# Patient Record
Sex: FEMALE | Race: OTHER | URBAN - NONMETROPOLITAN AREA
[De-identification: names, ages, dates, MRNs, and addresses within clinical notes are randomized per-mention and may not be internally consistent; named-entity substitution may affect disease eponyms.]

---

## 2024-06-06 ENCOUNTER — HOSPITAL ENCOUNTER (EMERGENCY)
Age: 24
Discharge: HOME | End: 2024-06-06

## 2024-06-06 VITALS
RESPIRATION RATE: 17 BRPM | DIASTOLIC BLOOD PRESSURE: 69 MMHG | OXYGEN SATURATION: 98 % | SYSTOLIC BLOOD PRESSURE: 106 MMHG | TEMPERATURE: 98.1 F | HEART RATE: 81 BPM

## 2024-06-06 DIAGNOSIS — N61.1: Primary | ICD-10-CM

## 2024-06-06 PROCEDURE — 99282 EMERGENCY DEPT VISIT SF MDM: CPT

## 2024-06-06 NOTE — ED TRIAGE NOTES
Pt to ED via private vehicle w/rprts of needing further evaluation of abscess to R breast. Pt rprts sent in by pcp. Denies pain at this time. No redness or heat noted to site. Some induration at 3 o'clock noted to R areola.

## 2024-06-07 NOTE — ED PROVIDER NOTES
significant fluid collection was noted under the irregular skin.  No signs of infection.  I do not feel the patient requires further antibiotics at this time.  They should follow-up for repeat ultrasound, and mammogram.  They need to follow-up with OB/GYN for continued evaluation.  May need skin biopsy if abnormal skin persists.  Patient verbalized understanding of plan of care.  Medications - No data to display    Patient was seen independently by myself. The patient's final impression and disposition and plan was determined by myself.     CRITICAL CARE:   None    CONSULTS:  None    PROCEDURES:  None    FINAL IMPRESSION     1. Chronic abscess of areola          DISPOSITION/PLAN   Patient discharged  PATIENT REFERREDTO:  Alison Morales APRN - NP  [address] [facility] [address] [phone]    Go to   For follow up and evaluation      DISCHARGE MEDICATIONS:  There are no discharge medications for this patient.      (Please note that portions of this note were completed with a voice recognition program.  Efforts were made to edit the dictations but occasionally words are mis-transcribed.)      Provider:  I personally performed the services described in the documentation,reviewed and edited the documentation which was dictated to the scribe in my presence, and it accurately records my words and actions.    Zachary Dixon CNP 06/06/24 8:23 PM    ANA PAULA Galindo CNP, [place], APRN - CNP  06/06/24 2028

## 2024-06-27 ENCOUNTER — OFFICE VISIT (OUTPATIENT)
Dept: FAMILY MEDICINE CLINIC | Age: 24
End: 2024-06-27

## 2024-06-27 VITALS
OXYGEN SATURATION: 98 % | RESPIRATION RATE: 15 BRPM | DIASTOLIC BLOOD PRESSURE: 66 MMHG | HEART RATE: 81 BPM | HEIGHT: 59 IN | TEMPERATURE: 98.1 F | SYSTOLIC BLOOD PRESSURE: 98 MMHG | BODY MASS INDEX: 31.85 KG/M2 | WEIGHT: 158 LBS

## 2024-06-27 DIAGNOSIS — N61.1 BREAST ABSCESS: Primary | ICD-10-CM

## 2024-06-27 PROCEDURE — 99203 OFFICE O/P NEW LOW 30 MIN: CPT

## 2024-06-27 SDOH — ECONOMIC STABILITY: FOOD INSECURITY: WITHIN THE PAST 12 MONTHS, YOU WORRIED THAT YOUR FOOD WOULD RUN OUT BEFORE YOU GOT MONEY TO BUY MORE.: NEVER TRUE

## 2024-06-27 SDOH — ECONOMIC STABILITY: FOOD INSECURITY: WITHIN THE PAST 12 MONTHS, THE FOOD YOU BOUGHT JUST DIDN'T LAST AND YOU DIDN'T HAVE MONEY TO GET MORE.: NEVER TRUE

## 2024-06-27 SDOH — ECONOMIC STABILITY: HOUSING INSECURITY
IN THE LAST 12 MONTHS, WAS THERE A TIME WHEN YOU DID NOT HAVE A STEADY PLACE TO SLEEP OR SLEPT IN A SHELTER (INCLUDING NOW)?: NO

## 2024-06-27 SDOH — ECONOMIC STABILITY: INCOME INSECURITY: HOW HARD IS IT FOR YOU TO PAY FOR THE VERY BASICS LIKE FOOD, HOUSING, MEDICAL CARE, AND HEATING?: NOT HARD AT ALL

## 2024-06-27 ASSESSMENT — PATIENT HEALTH QUESTIONNAIRE - PHQ9
SUM OF ALL RESPONSES TO PHQ QUESTIONS 1-9: 0
SUM OF ALL RESPONSES TO PHQ QUESTIONS 1-9: 0
SUM OF ALL RESPONSES TO PHQ9 QUESTIONS 1 & 2: 0
SUM OF ALL RESPONSES TO PHQ QUESTIONS 1-9: 0
2. FEELING DOWN, DEPRESSED OR HOPELESS: NOT AT ALL
1. LITTLE INTEREST OR PLEASURE IN DOING THINGS: NOT AT ALL
SUM OF ALL RESPONSES TO PHQ QUESTIONS 1-9: 0

## 2024-06-27 NOTE — PROGRESS NOTES
Patient:Nargis Conley  YOB: 2000   MRN:[id number]    Subjective   24 y.o. female who presents for the following: New Patient (Pt wants to discuss a referral to OBGYN. )    24 year old is a new patient with no significant medical history presents for acute visit. Patient reports lump/tenderness on right breast for past 2 months. Patient reports 2 months prior lump was draining pus, blood with green/white fluid. At the time, Ultrasound was suspicious for abscess along the right areola, and patient was managed with antibiotics. Patient reports ongoing tenderness/lump along the right areola. Patient denies any recent trauma, ongoing drainage, fever, chills, nipple retractions, and weight loss.     OBGYN History:  vaginal delivery with no complication (4 year old daughter) not actively breast feeding, sexually active with partner, currently on Medroxyprogesterone acetate injectable (No monthly periods per patient)    No prior Medical History. NKDA. No prior Surgeries. Not currenly on any medications  FH: no medical history  SH: Works as a care taker, Non-alcoholic, no tobacco use history, no Illicit drug use    Recent Ultrasound of breast, and mammogram of breast.  Pending mammogram in 2024.               Review of Systems   Constitutional:         Breast lump and tenderness   All other systems reviewed and are negative.    PMHx: She has no past medical history on file.    Objective     Vitals:    24 1512   BP: 98/66   Site: Right Upper Arm   Pulse: 81   Resp: 15   Temp: 98.1 °F (36.7 °C)   TempSrc: Oral   SpO2: 98%   Weight: 71.7 kg (158 lb)   Height: 1.499 m (4' 11\")   Body mass index is 31.91 kg/m².    Physical Exam  Exam conducted with a chaperone present.   Constitutional:       General: She is not in acute distress.     Appearance: Normal appearance. She is not ill-appearing.   HENT:      Head: Normocephalic and atraumatic.   Cardiovascular:      Rate and Rhythm: Normal

## 2024-07-31 ENCOUNTER — OFFICE VISIT (OUTPATIENT)
Dept: FAMILY MEDICINE CLINIC | Age: 24
End: 2024-07-31

## 2024-07-31 VITALS
DIASTOLIC BLOOD PRESSURE: 72 MMHG | WEIGHT: 157 LBS | RESPIRATION RATE: 18 BRPM | TEMPERATURE: 98.1 F | BODY MASS INDEX: 30.82 KG/M2 | SYSTOLIC BLOOD PRESSURE: 108 MMHG | HEIGHT: 60 IN | HEART RATE: 74 BPM | OXYGEN SATURATION: 99 %

## 2024-07-31 DIAGNOSIS — N61.1 BREAST ABSCESS: ICD-10-CM

## 2024-07-31 DIAGNOSIS — Z00.00 LABORATORY EXAM ORDERED AS PART OF ROUTINE GENERAL MEDICAL EXAMINATION: Primary | ICD-10-CM

## 2024-07-31 LAB — PAP SMEAR: NORMAL

## 2024-07-31 PROCEDURE — 99213 OFFICE O/P EST LOW 20 MIN: CPT

## 2024-07-31 NOTE — PROGRESS NOTES
Health Maintenance Due   Topic Date Due    Hepatitis B vaccine (1 of 3 - 3-dose series) Never done    COVID-19 Vaccine (1) Never done    Varicella vaccine (1 of 2 - 2-dose childhood series) Never done    HPV vaccine (1 - 2-dose series) Never done    HIV screen  Never done    Hepatitis C screen  Never done    Pap smear  Never done

## 2024-07-31 NOTE — PROGRESS NOTES
Patient:Nargis Conley  YOB: 2000   MRN:[id number]    Subjective   24 y.o. female who presents for the following: Follow-up (Breast tenderness )    23y/o female no significant PMH. Prior visit presented with healing right breast abscess confirmed on prior Ultrasounds. Patient s/p 10 days antibiotics treatment with doxycycline and Augmentin.    Presents for follow up on recent Ultrasound of right breast and establishment of care. Patient notes ongoing right breast tenderness over healing breast abscess. Denies any fever, chills, warmth, drainage, nipple retraction. Not currently breast feeding.    Obgyn HX:  female. Prior vaginal delivery (4 years ago). Uncomplicated pregnancy. On Medroxyprogesterone injectable (no menstrual cycle). Sexually active.     Family history :no prior family history    Screening   - Mammogram- completed  - DEXA- no indication  - Colonoscopy-no indication  - Pap Smear- performed in clinic- pending results      Review of Systems   Constitutional:         Breast lump and tenderness   All other systems reviewed and are negative.    PMHx: She has no past medical history on file.    Objective     Vitals:    24 1545   BP: 108/72   Site: Right Upper Arm   Position: Sitting   Pulse: 74   Resp: 18   Temp: 98.1 °F (36.7 °C)   TempSrc: Oral   SpO2: 99%   Weight: 71.2 kg (157 lb)   Height: 1.511 m (4' 11.5\")   Body mass index is 31.18 kg/m².    Physical Exam  Exam conducted with a chaperone present.   Constitutional:       General: She is not in acute distress.     Appearance: Normal appearance. She is not ill-appearing.   HENT:      Head: Normocephalic and atraumatic.   Cardiovascular:      Rate and Rhythm: Normal rate and regular rhythm.      Pulses: Normal pulses.      Heart sounds: Normal heart sounds. No murmur heard.     No friction rub. No gallop.   Pulmonary:      Effort: Pulmonary effort is normal. No respiratory distress.      Breath sounds: Normal breath

## 2024-07-31 NOTE — PROGRESS NOTES
Attending attestation:  I personally performed and participated key or critical portions of the evaluation and management including personally performing the exam and medical decision making.  I verify the accuracy of the documentation by the resident with the following addition or changes: 24 year old female here for right breast abscess.  Was treated with doxycycline and augmentin.  Was 3 cm across on last ultrasound.  On exam, not clearly palpable. Slight erythema at healing site of spontaneous drainage adjacent to nipple at 3 o'clock but no warmth or erthema otherwise.  Will repeat ultrasound. Plan based on results. If still present refer to Dr. Jimenez.  Well check with pap today also. Cervical ectropion present but hopefully okay on pap. No symptoms. Await results.  No menses in quite some time but on depo.  Declines pregnancy test today.            Electronically signed by Esther Torres MD on 7/31/2024 at 3:47 PM

## 2024-08-02 ENCOUNTER — LAB (OUTPATIENT)
Dept: LAB | Age: 24
End: 2024-08-02

## 2024-08-02 DIAGNOSIS — Z00.00 LABORATORY EXAM ORDERED AS PART OF ROUTINE GENERAL MEDICAL EXAMINATION: ICD-10-CM

## 2024-08-02 LAB
HCV IGG SERPL QL IA: NEGATIVE
HIV 1+2 AB+HIV1 P24 AG SERPL QL IA: NORMAL

## 2024-08-10 LAB — CYTOLOGY THIN PREP PAP: NORMAL

## 2024-08-14 ENCOUNTER — TELEPHONE (OUTPATIENT)
Dept: FAMILY MEDICINE CLINIC | Age: 24
End: 2024-08-14

## 2024-08-14 ENCOUNTER — HOSPITAL ENCOUNTER (OUTPATIENT)
Dept: WOMENS IMAGING | Age: 24
Discharge: HOME | End: 2024-08-14
Attending: RADIOLOGY

## 2024-08-14 ENCOUNTER — HOSPITAL ENCOUNTER (OUTPATIENT)
Dept: WOMENS IMAGING | Age: 24
Discharge: HOME | End: 2024-08-14
Payer: COMMERCIAL

## 2024-08-14 DIAGNOSIS — N61.1 BREAST ABSCESS: ICD-10-CM

## 2024-08-14 DIAGNOSIS — Z00.6 ENCOUNTER FOR EXAMINATION FOR NORMAL COMPARISON OR CONTROL IN CLINICAL RESEARCH PROGRAM: ICD-10-CM

## 2024-08-14 DIAGNOSIS — Z01.419 WELL WOMAN EXAM WITH ROUTINE GYNECOLOGICAL EXAM: ICD-10-CM

## 2024-08-14 PROCEDURE — 76642 ULTRASOUND BREAST LIMITED: CPT

## 2024-08-14 NOTE — TELEPHONE ENCOUNTER
----- Message from Arline SANABRIA sent at 8/13/2024  7:13 AM EDT -----  Please notify patient that their lab results are normal. Pap smear was normal. Thanks

## 2024-08-15 NOTE — TELEPHONE ENCOUNTER
Attempted to contact patient using interpretor services. Per HIPAA, interpretor left detailed VM.

## 2024-08-16 DIAGNOSIS — L02.91 ABSCESS: Primary | ICD-10-CM

## 2024-08-16 DIAGNOSIS — Z09 FOLLOW-UP EXAM: ICD-10-CM

## 2024-08-20 ENCOUNTER — FOLLOWUP TELEPHONE ENCOUNTER (OUTPATIENT)
Dept: FAMILY MEDICINE CLINIC | Age: 24
End: 2024-08-20

## 2024-08-20 NOTE — TELEPHONE ENCOUNTER
Please inform patient Breast imaging showed mildly reduced abscess from prior studies in 07/2024. We will repeat another ultrasound in one month to access any further change. We will consider referral to surgery pending results on the next ultrasound of breast. The scheduling office will call to schedule an appointment. Thanks

## 2024-08-21 NOTE — TELEPHONE ENCOUNTER
Received call from patient and friend(HIPAA) requesting results from US. Advised patient results per Dr. Robertson. Patient and friend stated that did not make sense and patient is still in a great deal of pain and wants to know what she has to do next. Patient scheduled appointment for 8/27/2024.

## 2024-08-27 ENCOUNTER — OFFICE VISIT (OUTPATIENT)
Dept: FAMILY MEDICINE CLINIC | Age: 24
End: 2024-08-27

## 2024-08-27 VITALS
RESPIRATION RATE: 20 BRPM | BODY MASS INDEX: 30.78 KG/M2 | OXYGEN SATURATION: 98 % | SYSTOLIC BLOOD PRESSURE: 98 MMHG | DIASTOLIC BLOOD PRESSURE: 54 MMHG | WEIGHT: 156.8 LBS | HEIGHT: 60 IN | HEART RATE: 78 BPM | TEMPERATURE: 98.9 F

## 2024-08-27 DIAGNOSIS — N64.4 BREAST TENDERNESS: ICD-10-CM

## 2024-08-27 DIAGNOSIS — N61.1 BREAST ABSCESS: Primary | ICD-10-CM

## 2024-08-27 PROCEDURE — 99213 OFFICE O/P EST LOW 20 MIN: CPT

## 2024-08-27 RX ORDER — IBUPROFEN 600 MG/1
600 TABLET, FILM COATED ORAL 3 TIMES DAILY PRN
Qty: 30 TABLET | Refills: 0 | Status: SHIPPED | OUTPATIENT
Start: 2024-08-27

## 2024-08-27 NOTE — PROGRESS NOTES
Patient:Nargis Ritter  YOB: 2000   MRN:[id number]    Subjective   24 y.o. female who presents for the following: Follow-up (Discuss breast ultrasound results. )      24-year-old female no significant past medical history presents with 3 month [since 05/2024] history of right breast abscess.  Initially, abscess was draining pus, blood with green/white fluid and patient was managed with 10-day course of Flagyl and amoxicillin.  Following treatment, patient reports mild reduction of abscess with ongoing right breast tenderness. Since 06/2024, patient completed 2 breast ultrasounds with most recent ultrasound 08/2024 which reported mild decrease in abscess size.  Today in clinic, patient reports residual breast abscess with significant tenderness. Patient denies any fevers, chills, ongoing drainage, nipple retraction, erythema, warmth, edema.      Review of Systems   Respiratory:          Right breast tenderness     PMHx: She has no past medical history on file.    Objective     Vitals:    08/27/24 1027   BP: (!) 98/54   Site: Left Upper Arm   Position: Sitting   Cuff Size: Medium Adult   Pulse: 78   Resp: 20   Temp: 98.9 °F (37.2 °C)   TempSrc: Oral   SpO2: 98%   Weight: 71.1 kg (156 lb 12.8 oz)   Height: 1.511 m (4' 11.5\")   Body mass index is 31.14 kg/m².    Physical Exam  Constitutional:       General: She is not in acute distress.     Appearance: Normal appearance. She is not ill-appearing.   HENT:      Head: Normocephalic and atraumatic.   Cardiovascular:      Rate and Rhythm: Normal rate and regular rhythm.      Pulses: Normal pulses.      Heart sounds: Normal heart sounds. No murmur heard.     No friction rub. No gallop.   Pulmonary:      Effort: Pulmonary effort is normal. No respiratory distress.      Breath sounds: Normal breath sounds. No wheezing or rales.   Chest:          Comments: Skin colored mass adjacent to right nipple at 3' o clock. No warmth, erythema, edema and  drainage.  Abdominal:      General: Abdomen is flat. Bowel sounds are normal. There is no distension.      Palpations: Abdomen is soft.      Tenderness: There is no abdominal tenderness. There is no guarding.   Musculoskeletal:         General: Normal range of motion.   Skin:     General: Skin is warm.      Capillary Refill: Capillary refill takes less than 2 seconds.   Neurological:      General: No focal deficit present.      Mental Status: She is alert.      Cranial Nerves: No cranial nerve deficit.   Psychiatric:         Mood and Affect: Mood normal.         Behavior: Behavior normal.         Thought Content: Thought content normal.         Judgment: Judgment normal.       Most Recent Data:  No results found for: \"WBC\", \"HGB\", \"HCT\", \"PLT\", \"CHOL\", \"TRIG\", \"HDL\", \"LDLDIRECT\", \"ALT\", \"AST\", \"NA\", \"CL\", \"K\", \"CREATININE\", \"BUN\", \"CO2\", \"TSH\", \"INR\", \"GLUF\", \"LABA1C\", \"PSA\"  US BREAST LIMITED RIGHT  Narrative: LOCATION: [facility]    PROCEDURE: US BREAST LIMITED RIGHT    CLINICAL INFORMATION: Abscess of the breast and nipple; Encounter for  gynecological examination (general) (routine) without abnormal findings .    PATIENT MEDICAL HISTORY:  No relevant medical history has been documented for  this patient.  FAMILY HISTORY:   No relevant family history has been documented for this  patient.  RISK VALUES: Tyrer-Cuzick 10yr.: 0.13%, Tyrer-Cuzick life:   7.16%    COMPARISON: 7/8/2024, 5/6/2024    TECHNIQUE: Targeted ultrasound of the right breast was performed. Grayscale  images and color images of the real-time examination were reviewed. The axilla  was also imaged.    FINDINGS:    There is a small residual collection of fluid within the subcutaneous soft  tissues immediately beneath the dermal surface measuring approximately 1.2 x 0.5  x 2.1 cm. This appears slightly decreased in size from prior examination dated  7/8/2024.    There is a mildly prominent lymph node within the right axilla with mild  cortical thickening

## 2024-08-29 ENCOUNTER — OFFICE VISIT (OUTPATIENT)
Dept: SURGERY | Age: 24
End: 2024-08-29

## 2024-08-29 VITALS
SYSTOLIC BLOOD PRESSURE: 100 MMHG | TEMPERATURE: 98.1 F | OXYGEN SATURATION: 98 % | HEIGHT: 60 IN | BODY MASS INDEX: 31.14 KG/M2 | HEART RATE: 64 BPM | DIASTOLIC BLOOD PRESSURE: 68 MMHG

## 2024-08-29 DIAGNOSIS — N61.1 ABSCESS OF RIGHT BREAST: Primary | ICD-10-CM

## 2024-08-29 DIAGNOSIS — N64.4 BREAST PAIN, RIGHT: ICD-10-CM

## 2024-08-29 PROCEDURE — 99204 OFFICE O/P NEW MOD 45 MIN: CPT | Performed by: SURGERY

## 2024-08-29 NOTE — PROGRESS NOTES
Gill Jimenez MD   General Surgery  New Patient Evaluation in Office  Pt Name: Nargis Ritter  Date of Birth 2000   Today's Date: 8/29/2024  Medical Record Number: [id number]  Referring Provider: Arline Robertson MD  Primary Care Provider: Arline Robertson MD  Chief Complaint:  Chief Complaint   Patient presents with    Surgical Consult     NP refer Dr. Robertson-Right breast abscess       ASSESSMENT      1. Abscess of right breast    2. Breast pain, right    3.  BMI 31     PLANS   Assessment & Plan   Right breast pain small residual fluid collection but with persistent symptoms.  Patient with multiple ultrasounds which were reviewed and multiple courses of antibiotics.  Patient declined aspiration at this visit.  She desires to proceed with excision versus incision and drainage and open packing.  History physical examination with  present  4.  Outside records, imaging and in-house imaging all reviewed and reviewed with patient.   5.  Techniques and risks of surgical procedure discussed with patient and all questions answered.  Risk include but not limited to need for open wound recurrent infection as well as cosmetic defect.  6.  Schedule patient for incision and drainage right breast abscess excision of skin cyst right nipple.  7.  MAC anesthesia  8.  Preoperative testing per anesthesia guidelines  SUBJECTIVE     Nargis is a 24 y.o. year old female who is presenting today in the office for evaluation of a right breast abscess.   She has been seen previously at [facility] also with follow-up ultrasounds here.  I cannot see that any aspiration was performed.  She has had multiple courses of antibiotics.  It remains tender.  She was seen in the medicine clinic.  She has a small residual fluid collection there is no real evidence of significant infection in the breast at this time but it remains markedly tender.  She was offered aspiration in the office which she declined.  Also

## 2024-08-30 PROBLEM — N64.4 BREAST PAIN, RIGHT: Status: ACTIVE | Noted: 2024-08-30

## 2024-08-30 PROBLEM — N61.1 ABSCESS OF RIGHT BREAST: Status: ACTIVE | Noted: 2024-08-30

## 2024-08-30 ASSESSMENT — ENCOUNTER SYMPTOMS
ROS SKIN COMMENTS: RIGHT BREAST PAIN
ABDOMINAL PAIN: 0
VOMITING: 0
TROUBLE SWALLOWING: 0
SORE THROAT: 0
WHEEZING: 0
SHORTNESS OF BREATH: 0
NAUSEA: 0
BLOOD IN STOOL: 0
COLOR CHANGE: 0
COUGH: 0
VOICE CHANGE: 0

## 2024-09-04 ENCOUNTER — HOSPITAL ENCOUNTER (OUTPATIENT)
Age: 24
Setting detail: OUTPATIENT SURGERY
Discharge: HOME | End: 2024-09-04
Attending: SURGERY | Admitting: SURGERY

## 2024-09-04 ENCOUNTER — ANESTHESIA EVENT (OUTPATIENT)
Dept: OPERATING ROOM | Age: 24
End: 2024-09-04

## 2024-09-04 ENCOUNTER — ANESTHESIA (OUTPATIENT)
Dept: OPERATING ROOM | Age: 24
End: 2024-09-04

## 2024-09-04 VITALS
TEMPERATURE: 97.6 F | BODY MASS INDEX: 32.25 KG/M2 | SYSTOLIC BLOOD PRESSURE: 99 MMHG | OXYGEN SATURATION: 100 % | DIASTOLIC BLOOD PRESSURE: 95 MMHG | HEIGHT: 59 IN | WEIGHT: 160 LBS | RESPIRATION RATE: 16 BRPM | HEART RATE: 73 BPM

## 2024-09-04 DIAGNOSIS — N60.01 BENIGN BREAST CYST IN FEMALE, RIGHT: Primary | ICD-10-CM

## 2024-09-04 DIAGNOSIS — N61.1 ABSCESS OF RIGHT BREAST: ICD-10-CM

## 2024-09-04 LAB — PREGNANCY, URINE: NEGATIVE

## 2024-09-04 PROCEDURE — 7100000010 HC PHASE II RECOVERY - FIRST 15 MIN: Performed by: SURGERY

## 2024-09-04 PROCEDURE — 6360000002 HC RX W HCPCS: Performed by: SURGERY

## 2024-09-04 PROCEDURE — 81025 URINE PREGNANCY TEST: CPT

## 2024-09-04 PROCEDURE — 3600000012 HC SURGERY LEVEL 2 ADDTL 15MIN: Performed by: SURGERY

## 2024-09-04 PROCEDURE — 6370000000 HC RX 637 (ALT 250 FOR IP): Performed by: SURGERY

## 2024-09-04 PROCEDURE — 2580000003 HC RX 258: Performed by: SURGERY

## 2024-09-04 PROCEDURE — 3700000000 HC ANESTHESIA ATTENDED CARE: Performed by: SURGERY

## 2024-09-04 PROCEDURE — 3700000001 HC ADD 15 MINUTES (ANESTHESIA): Performed by: SURGERY

## 2024-09-04 PROCEDURE — 6360000002 HC RX W HCPCS: Performed by: NURSE ANESTHETIST, CERTIFIED REGISTERED

## 2024-09-04 PROCEDURE — 88304 TISSUE EXAM BY PATHOLOGIST: CPT

## 2024-09-04 PROCEDURE — 2709999900 HC NON-CHARGEABLE SUPPLY: Performed by: SURGERY

## 2024-09-04 PROCEDURE — 3600000002 HC SURGERY LEVEL 2 BASE: Performed by: SURGERY

## 2024-09-04 PROCEDURE — 2500000003 HC RX 250 WO HCPCS: Performed by: NURSE ANESTHETIST, CERTIFIED REGISTERED

## 2024-09-04 PROCEDURE — 7100000011 HC PHASE II RECOVERY - ADDTL 15 MIN: Performed by: SURGERY

## 2024-09-04 RX ORDER — ONDANSETRON 2 MG/ML
4 INJECTION INTRAMUSCULAR; INTRAVENOUS EVERY 6 HOURS PRN
Status: DISCONTINUED | OUTPATIENT
Start: 2024-09-04 | End: 2024-09-04 | Stop reason: HOSPADM

## 2024-09-04 RX ORDER — PROPOFOL 10 MG/ML
INJECTION, EMULSION INTRAVENOUS CONTINUOUS PRN
Status: DISCONTINUED | OUTPATIENT
Start: 2024-09-04 | End: 2024-09-04 | Stop reason: SDUPTHER

## 2024-09-04 RX ORDER — ACETAMINOPHEN 500 MG
1000 TABLET ORAL ONCE
Status: COMPLETED | OUTPATIENT
Start: 2024-09-04 | End: 2024-09-04

## 2024-09-04 RX ORDER — MIDAZOLAM HYDROCHLORIDE 1 MG/ML
INJECTION INTRAMUSCULAR; INTRAVENOUS PRN
Status: DISCONTINUED | OUTPATIENT
Start: 2024-09-04 | End: 2024-09-04 | Stop reason: SDUPTHER

## 2024-09-04 RX ORDER — SODIUM CHLORIDE 0.9 % (FLUSH) 0.9 %
5-40 SYRINGE (ML) INJECTION PRN
Status: DISCONTINUED | OUTPATIENT
Start: 2024-09-04 | End: 2024-09-04 | Stop reason: HOSPADM

## 2024-09-04 RX ORDER — LIDOCAINE HYDROCHLORIDE 20 MG/ML
INJECTION, SOLUTION INTRAVENOUS PRN
Status: DISCONTINUED | OUTPATIENT
Start: 2024-09-04 | End: 2024-09-04 | Stop reason: SDUPTHER

## 2024-09-04 RX ORDER — SODIUM CHLORIDE 9 MG/ML
INJECTION, SOLUTION INTRAVENOUS CONTINUOUS
Status: DISCONTINUED | OUTPATIENT
Start: 2024-09-04 | End: 2024-09-04 | Stop reason: HOSPADM

## 2024-09-04 RX ORDER — IBUPROFEN 800 MG/1
800 TABLET, FILM COATED ORAL ONCE
Status: COMPLETED | OUTPATIENT
Start: 2024-09-04 | End: 2024-09-04

## 2024-09-04 RX ORDER — DEXAMETHASONE SODIUM PHOSPHATE 10 MG/ML
INJECTION, EMULSION INTRAMUSCULAR; INTRAVENOUS PRN
Status: DISCONTINUED | OUTPATIENT
Start: 2024-09-04 | End: 2024-09-04 | Stop reason: SDUPTHER

## 2024-09-04 RX ORDER — TRAMADOL HYDROCHLORIDE 50 MG/1
100 TABLET ORAL EVERY 6 HOURS PRN
Status: DISCONTINUED | OUTPATIENT
Start: 2024-09-04 | End: 2024-09-04 | Stop reason: HOSPADM

## 2024-09-04 RX ORDER — DEXAMETHASONE SODIUM PHOSPHATE 4 MG/ML
8 INJECTION, SOLUTION INTRA-ARTICULAR; INTRALESIONAL; INTRAMUSCULAR; INTRAVENOUS; SOFT TISSUE ONCE
Status: COMPLETED | OUTPATIENT
Start: 2024-09-04 | End: 2024-09-04

## 2024-09-04 RX ORDER — SODIUM CHLORIDE 9 MG/ML
INJECTION, SOLUTION INTRAVENOUS PRN
Status: DISCONTINUED | OUTPATIENT
Start: 2024-09-04 | End: 2024-09-04 | Stop reason: HOSPADM

## 2024-09-04 RX ORDER — SODIUM CHLORIDE 0.9 % (FLUSH) 0.9 %
5-40 SYRINGE (ML) INJECTION EVERY 12 HOURS SCHEDULED
Status: DISCONTINUED | OUTPATIENT
Start: 2024-09-04 | End: 2024-09-04 | Stop reason: HOSPADM

## 2024-09-04 RX ORDER — ACETAMINOPHEN 325 MG/1
650 TABLET ORAL EVERY 4 HOURS PRN
Status: DISCONTINUED | OUTPATIENT
Start: 2024-09-04 | End: 2024-09-04 | Stop reason: HOSPADM

## 2024-09-04 RX ORDER — BUPIVACAINE HYDROCHLORIDE 5 MG/ML
INJECTION, SOLUTION EPIDURAL; INTRACAUDAL PRN
Status: DISCONTINUED | OUTPATIENT
Start: 2024-09-04 | End: 2024-09-04 | Stop reason: ALTCHOICE

## 2024-09-04 RX ORDER — MORPHINE SULFATE 2 MG/ML
2 INJECTION, SOLUTION INTRAMUSCULAR; INTRAVENOUS
Status: DISCONTINUED | OUTPATIENT
Start: 2024-09-04 | End: 2024-09-04 | Stop reason: HOSPADM

## 2024-09-04 RX ORDER — TRAMADOL HYDROCHLORIDE 50 MG/1
50 TABLET ORAL EVERY 6 HOURS PRN
Qty: 12 TABLET | Refills: 0 | Status: SHIPPED | OUTPATIENT
Start: 2024-09-04 | End: 2024-09-07

## 2024-09-04 RX ORDER — TRAMADOL HYDROCHLORIDE 50 MG/1
50 TABLET ORAL EVERY 6 HOURS PRN
Status: DISCONTINUED | OUTPATIENT
Start: 2024-09-04 | End: 2024-09-04 | Stop reason: HOSPADM

## 2024-09-04 RX ORDER — FENTANYL CITRATE 50 UG/ML
INJECTION, SOLUTION INTRAMUSCULAR; INTRAVENOUS PRN
Status: DISCONTINUED | OUTPATIENT
Start: 2024-09-04 | End: 2024-09-04 | Stop reason: SDUPTHER

## 2024-09-04 RX ORDER — ONDANSETRON 4 MG/1
4 TABLET, ORALLY DISINTEGRATING ORAL EVERY 8 HOURS PRN
Status: DISCONTINUED | OUTPATIENT
Start: 2024-09-04 | End: 2024-09-04 | Stop reason: HOSPADM

## 2024-09-04 RX ORDER — MORPHINE SULFATE 4 MG/ML
4 INJECTION, SOLUTION INTRAMUSCULAR; INTRAVENOUS
Status: DISCONTINUED | OUTPATIENT
Start: 2024-09-04 | End: 2024-09-04 | Stop reason: HOSPADM

## 2024-09-04 RX ADMIN — ACETAMINOPHEN 1000 MG: 500 TABLET ORAL at 07:31

## 2024-09-04 RX ADMIN — PROPOFOL 100 MCG/KG/MIN: 10 INJECTION, EMULSION INTRAVENOUS at 08:39

## 2024-09-04 RX ADMIN — FENTANYL CITRATE 50 MCG: 50 INJECTION, SOLUTION INTRAMUSCULAR; INTRAVENOUS at 08:45

## 2024-09-04 RX ADMIN — SODIUM CHLORIDE: 9 INJECTION, SOLUTION INTRAVENOUS at 07:27

## 2024-09-04 RX ADMIN — Medication 20 MG: at 08:49

## 2024-09-04 RX ADMIN — DEXAMETHASONE SODIUM PHOSPHATE 8 MG: 4 INJECTION, SOLUTION INTRA-ARTICULAR; INTRALESIONAL; INTRAMUSCULAR; INTRAVENOUS; SOFT TISSUE at 07:31

## 2024-09-04 RX ADMIN — LIDOCAINE HYDROCHLORIDE 80 MG: 20 INJECTION, SOLUTION INTRAVENOUS at 08:39

## 2024-09-04 RX ADMIN — IBUPROFEN 800 MG: 800 TABLET, FILM COATED ORAL at 07:31

## 2024-09-04 RX ADMIN — PROPOFOL 50 MG: 10 INJECTION, EMULSION INTRAVENOUS at 08:40

## 2024-09-04 RX ADMIN — DEXAMETHASONE SODIUM PHOSPHATE 10 MG: 10 INJECTION, EMULSION INTRAMUSCULAR; INTRAVENOUS at 08:42

## 2024-09-04 RX ADMIN — MIDAZOLAM 2 MG: 1 INJECTION INTRAMUSCULAR; INTRAVENOUS at 08:37

## 2024-09-04 RX ADMIN — WATER 2000 MG: 1 INJECTION INTRAMUSCULAR; INTRAVENOUS; SUBCUTANEOUS at 08:45

## 2024-09-04 RX ADMIN — FENTANYL CITRATE 50 MCG: 50 INJECTION, SOLUTION INTRAMUSCULAR; INTRAVENOUS at 08:39

## 2024-09-04 ASSESSMENT — PAIN DESCRIPTION - ORIENTATION: ORIENTATION: RIGHT

## 2024-09-04 ASSESSMENT — PAIN - FUNCTIONAL ASSESSMENT
PAIN_FUNCTIONAL_ASSESSMENT: ACTIVITIES ARE NOT PREVENTED
PAIN_FUNCTIONAL_ASSESSMENT: 0-10

## 2024-09-04 ASSESSMENT — PAIN DESCRIPTION - DESCRIPTORS: DESCRIPTORS: DULL

## 2024-09-04 ASSESSMENT — PAIN DESCRIPTION - LOCATION: LOCATION: BREAST

## 2024-09-04 NOTE — H&P
Right: Skin change present. No inverted nipple, mass or nipple discharge.      Left: No inverted nipple, mass, nipple discharge or skin change.        Abdominal:      General: There is no distension.      Palpations: There is no mass.      Tenderness: There is no abdominal tenderness.   Musculoskeletal:         General: No deformity.      Right lower leg: No edema.      Left lower leg: No edema.   Lymphadenopathy:      Cervical: No cervical adenopathy.      Right cervical: No superficial, deep or posterior cervical adenopathy.     Left cervical: No superficial, deep or posterior cervical adenopathy.      Upper Body:      Right upper body: No supraclavicular, axillary or pectoral adenopathy.      Left upper body: No supraclavicular, axillary or pectoral adenopathy.   Skin:     General: Skin is warm and dry.      Coloration: Skin is not jaundiced or pale.      Findings: No rash.   Neurological:      General: No focal deficit present.      Mental Status: She is alert and oriented to person, place, and time.      Cranial Nerves: No cranial nerve deficit.   Psychiatric:         Mood and Affect: Mood normal.         Behavior: Behavior normal.            No results found for: \"WBC\", \"HGB\", \"HCT\", \"PLT\", \"CHOL\", \"TRIG\", \"HDL\", \"LDLDIRECT\", \"ALT\", \"AST\", \"NA\", \"K\", \"CL\", \"CREATININE\", \"BUN\", \"CO2\", \"TSH\", \"PSA\", \"INR\", \"GLUF\", \"LABA1C\"        Recommendation Procedure Ordered Performed Authorizing   Ultrasound  US BREAST LIMITED RIGHT 7/31/2024 Needs Scheduling Tapan Sandra MD       Contains abnormal data US BREAST LIMITED RIGHT  Order: [id number]  Status: Final result       Visible to patient: No (not released)       Next appt: 11/15/2024 at 10:00 AM in Family Medicine (Arline Robertson MD)       Dx: Breast abscess; Well woman exam with ...    1 Result Note  Assessment     Overall Right   3 - Probably Benign 3 - Probably Benign   Details     Reading Physician Reading Date Result Priority   Tapan Sandra

## 2024-09-04 NOTE — OP NOTE
Operative Note      Patient: Nargis Ritter  YOB: 2000  MRN: [id number]    Date of Procedure: 9/4/2024    Pre-Op Diagnosis Codes:      * Abscess of right breast [N61.1]  Right breast cyst  Post-Op Diagnosis: Same     Abscess resolved  Procedure: Excision right breast cyst, symptomatic.  Abscess resolved    Surgeon(s):  Gill Jimenez MD    Assistant:   First Assistant: Bean Stephens RN    Anesthesia: Monitor Anesthesia Care    Estimated Blood Loss (mL): Minimal    Complications: None    Specimens:   ID Type Source Tests Collected by Time Destination   A : right breast cyst tissue Tissue Breast SURGICAL PATHOLOGY Gill Jimenez MD 9/4/2024 0852        Implants:  * No implants in log *      Drains: * No LDAs found *    Findings:  Infection Present At Time Of Surgery (PATOS) (choose all levels that have infection present):  No infection present      Detailed Description of Procedure:   The patient was brought to the operating suite.  Telemetry  available until patient sedated.  Patient was placed supine on the operating table.  She was given antibiotic intravenously.  Patient was given anesthesia, sedation per the anesthesia department.  Right breast was prepped and draped in the usual sterile fashion.  Right breast had been marked preoperatively.  After infiltrating with local anesthetic patient had a residual small cystic lesion that occasionally drains serous fluid at the 3 o'clock position of the right nipple.  It was within the areolar.  Elliptical incision was made about the lesion and dissection was carried down there was a dilated duct in association which was excised.  Specimen was sent for pathology.  Hemostasis was achieved electrocautery.  Wound was irrigated.  Dermis was closed with interrupted 3-0 Vicryl suture and skin was closed running subcuticular 4-0 Monocryl suture.  Skin glue was applied.  Patient tolerated the procedure well was transported back to same-day surgery

## 2024-09-04 NOTE — ANESTHESIA PRE PROCEDURE
Department of Anesthesiology  Preprocedure Note       Name:  Nargis Ritter   Age:  24 y.o.  :  2000                                          MRN:  [id number]         Date:  2024      Surgeon: Surgeon(s):  Gill Jimenez MD    Procedure: Procedure(s):  I&D Right Breast,Abscess Excision Nipple Cyst    Medications prior to admission:   Prior to Admission medications    Medication Sig Start Date End Date Taking? Authorizing Provider   ibuprofen (ADVIL;MOTRIN) 600 MG tablet Take 1 tablet by mouth 3 times daily as needed for Pain 24  Yes Arline Robertson MD       Current medications:    Current Facility-Administered Medications   Medication Dose Route Frequency Provider Last Rate Last Admin   • 0.9 % sodium chloride infusion   IntraVENous Continuous Gill Jimenez  mL/hr at 24 New Bag at 24   • sodium chloride flush 0.9 % injection 5-40 mL  5-40 mL IntraVENous 2 times per day Gill Jimenez MD       • sodium chloride flush 0.9 % injection 5-40 mL  5-40 mL IntraVENous PRN Gill Jimenez MD       • 0.9 % sodium chloride infusion   IntraVENous PRN Gill Jimenez MD       • ceFAZolin (ANCEF) 2,000 mg in sterile water 20 mL IV syringe  2,000 mg IntraVENous On Call to OR Gill Jimenez MD           Allergies:  No Known Allergies    Problem List:    Patient Active Problem List   Diagnosis Code   • Abscess of right breast N61.1   • Breast pain, right N64.4       Past Medical History:  History reviewed. No pertinent past medical history.    Past Surgical History:  History reviewed. No pertinent surgical history.    Social History:    Social History     Tobacco Use   • Smoking status: Never   • Smokeless tobacco: Never   Substance Use Topics   • Alcohol use: Not Currently                                Counseling given: Not Answered      Vital Signs (Current):   Vitals:    24 0717   BP: 98/61   Pulse: 82   Resp: 16   Temp: 96.9 °F (36.1 °C)   TempSrc: Temporal   SpO2: 95%

## 2024-09-04 NOTE — PROGRESS NOTES

## 2024-09-04 NOTE — ANESTHESIA POSTPROCEDURE EVALUATION
Department of Anesthesiology  Postprocedure Note    Patient: Nargis Ritter  MRN: [id number]  YOB: 2000  Date of evaluation: 9/4/2024    Procedure Summary       Date: 09/04/24 Room / Location: [facility] OR  / [facility] OR    Anesthesia Start: 0836 Anesthesia Stop: 0905    Procedure: I&D Right Breast,Abscess Excision Nipple Cyst (Breast) Diagnosis:       Abscess of right breast      (Abscess of right breast [N61.1])    Surgeons: Gill Jimenez MD Responsible Provider: Bean Alan DO    Anesthesia Type: MAC ASA Status: 2            Anesthesia Type: No value filed.    Trina Phase I: Trina Score: 10    Trina Phase II: Trina Score: 10    Anesthesia Post Evaluation    Patient location during evaluation: PACU  Patient participation: complete - patient participated  Level of consciousness: awake and alert  Airway patency: patent  Nausea & Vomiting: no vomiting and no nausea  Cardiovascular status: hemodynamically stable  Respiratory status: acceptable  Hydration status: stable  Pain management: adequate    No notable events documented.

## 2024-09-04 NOTE — PROGRESS NOTES
Pt returned to [facility]\"" room 10. Vitals and assessment as charted. 0.9 infusing,  to count from PACU. Pt has crackers and water. Family at the bedside. Pt and family verbalized understanding of discharge criteria and call light use. Call light in reach.

## 2024-09-04 NOTE — PROGRESS NOTES
Pt admitted to [facility] room 10 and oriented to unit. SCD sleeves applied. Nares swabbed. Pt verbalized permission for first name, last initial and physicians name on white board. SDS board and discharge criteria explained, pt and family verbalized understanding. Pt denies thoughts of harming self or others. Call light in reach. Family at the bedside.

## 2024-09-12 ENCOUNTER — OFFICE VISIT (OUTPATIENT)
Dept: SURGERY | Age: 24
End: 2024-09-12

## 2024-09-12 VITALS
WEIGHT: 160 LBS | BODY MASS INDEX: 32.25 KG/M2 | HEIGHT: 59 IN | TEMPERATURE: 97.5 F | OXYGEN SATURATION: 98 % | HEART RATE: 93 BPM | SYSTOLIC BLOOD PRESSURE: 116 MMHG | DIASTOLIC BLOOD PRESSURE: 60 MMHG

## 2024-09-12 DIAGNOSIS — Z09 POSTOP CHECK: ICD-10-CM

## 2024-09-12 DIAGNOSIS — N61.1 ABSCESS OF RIGHT BREAST: ICD-10-CM

## 2024-09-12 DIAGNOSIS — N60.01 BENIGN BREAST CYST IN FEMALE, RIGHT: Primary | ICD-10-CM

## 2024-09-12 PROCEDURE — 99024 POSTOP FOLLOW-UP VISIT: CPT | Performed by: SURGERY

## 2024-09-17 ENCOUNTER — TELEPHONE (OUTPATIENT)
Dept: FAMILY MEDICINE CLINIC | Age: 24
End: 2024-09-17

## 2024-10-29 ENCOUNTER — HOSPITAL ENCOUNTER (OUTPATIENT)
Dept: WOMENS IMAGING | Age: 24
Discharge: HOME | End: 2024-10-29
Attending: RADIOLOGY
Payer: COMMERCIAL

## 2024-10-29 DIAGNOSIS — L02.91 ABSCESS: ICD-10-CM

## 2024-10-29 DIAGNOSIS — Z09 FOLLOW-UP EXAM: ICD-10-CM

## 2024-10-29 PROCEDURE — 76642 ULTRASOUND BREAST LIMITED: CPT

## 2025-02-12 ENCOUNTER — TELEPHONE (OUTPATIENT)
Dept: FAMILY MEDICINE CLINIC | Age: 25
End: 2025-02-12

## 2025-02-27 ENCOUNTER — OFFICE VISIT (OUTPATIENT)
Dept: FAMILY MEDICINE CLINIC | Age: 25
End: 2025-02-27

## 2025-02-27 ENCOUNTER — LAB (OUTPATIENT)
Dept: LAB | Age: 25
End: 2025-02-27

## 2025-02-27 VITALS
WEIGHT: 145.6 LBS | RESPIRATION RATE: 20 BRPM | DIASTOLIC BLOOD PRESSURE: 72 MMHG | HEIGHT: 59 IN | SYSTOLIC BLOOD PRESSURE: 102 MMHG | OXYGEN SATURATION: 98 % | HEART RATE: 91 BPM | BODY MASS INDEX: 29.35 KG/M2

## 2025-02-27 DIAGNOSIS — N60.01 BENIGN BREAST CYST IN FEMALE, RIGHT: Primary | ICD-10-CM

## 2025-02-27 DIAGNOSIS — Z01.89 ROUTINE LAB DRAW: ICD-10-CM

## 2025-02-27 LAB
ALBUMIN SERPL BCG-MCNC: 4.3 G/DL (ref 3.4–4.9)
ALP SERPL-CCNC: 72 U/L (ref 35–104)
ALT SERPL W/O P-5'-P-CCNC: 23 U/L (ref 10–35)
ANION GAP SERPL CALC-SCNC: 9 MEQ/L (ref 8–16)
AST SERPL-CCNC: 23 U/L (ref 10–35)
BASOPHILS ABSOLUTE: 0.1 THOU/MM3 (ref 0–0.1)
BASOPHILS NFR BLD AUTO: 0.9 %
BILIRUB SERPL-MCNC: 0.5 MG/DL (ref 0.3–1.2)
BUN SERPL-MCNC: 11 MG/DL (ref 8–23)
CALCIUM SERPL-MCNC: 9.5 MG/DL (ref 8.6–10)
CHLORIDE SERPL-SCNC: 104 MEQ/L (ref 98–111)
CHOLEST SERPL-MCNC: 119 MG/DL (ref 100–199)
CO2 SERPL-SCNC: 26 MEQ/L (ref 22–29)
CREAT SERPL-MCNC: 0.7 MG/DL (ref 0.5–0.9)
DEPRECATED MEAN GLUCOSE BLD GHB EST-ACNC: 102 MG/DL (ref 70–126)
DEPRECATED RDW RBC AUTO: 43.7 FL (ref 35–45)
EOSINOPHIL NFR BLD AUTO: 1.8 %
EOSINOPHILS ABSOLUTE: 0.1 THOU/MM3 (ref 0–0.4)
ERYTHROCYTE [DISTWIDTH] IN BLOOD BY AUTOMATED COUNT: 13.2 % (ref 11.5–14.5)
GFR SERPL CREATININE-BSD FRML MDRD: > 90 ML/MIN/1.73M2
GLUCOSE FASTING: 98 MG/DL (ref 74–109)
HBA1C MFR BLD HPLC: 5.4 % (ref 4–6)
HCT VFR BLD AUTO: 38.6 % (ref 37–47)
HDLC SERPL-MCNC: 41 MG/DL
HGB BLD-MCNC: 12.8 GM/DL (ref 12–16)
IMM GRANULOCYTES # BLD AUTO: 0.03 THOU/MM3 (ref 0–0.07)
IMM GRANULOCYTES NFR BLD AUTO: 0.4 %
LDLC SERPL CALC-MCNC: 65 MG/DL
LYMPHOCYTES ABSOLUTE: 2.3 THOU/MM3 (ref 1–4.8)
LYMPHOCYTES NFR BLD AUTO: 29.3 %
MCH RBC QN AUTO: 29.8 PG (ref 26–33)
MCHC RBC AUTO-ENTMCNC: 33.2 GM/DL (ref 32.2–35.5)
MCV RBC AUTO: 90 FL (ref 81–99)
MONOCYTES ABSOLUTE: 0.6 THOU/MM3 (ref 0.4–1.3)
MONOCYTES NFR BLD AUTO: 8 %
NEUTROPHILS ABSOLUTE: 4.7 THOU/MM3 (ref 1.8–7.7)
NEUTROPHILS NFR BLD AUTO: 59.6 %
NRBC BLD AUTO-RTO: 0 /100 WBC
PLATELET # BLD AUTO: 259 THOU/MM3 (ref 130–400)
PMV BLD AUTO: 10.4 FL (ref 9.4–12.4)
POTASSIUM SERPL-SCNC: 4.2 MEQ/L (ref 3.5–5.2)
PROT SERPL-MCNC: 7.3 G/DL (ref 6.4–8.3)
RBC # BLD AUTO: 4.29 MILL/MM3 (ref 4.2–5.4)
SODIUM SERPL-SCNC: 139 MEQ/L (ref 135–145)
TRIGL SERPL-MCNC: 65 MG/DL (ref 0–199)
TSH SERPL DL<=0.05 MIU/L-ACNC: 1.42 UIU/ML (ref 0.27–4.2)
WBC # BLD AUTO: 7.9 THOU/MM3 (ref 4.8–10.8)

## 2025-02-27 PROCEDURE — 99213 OFFICE O/P EST LOW 20 MIN: CPT

## 2025-02-27 SDOH — ECONOMIC STABILITY: FOOD INSECURITY: WITHIN THE PAST 12 MONTHS, YOU WORRIED THAT YOUR FOOD WOULD RUN OUT BEFORE YOU GOT MONEY TO BUY MORE.: NEVER TRUE

## 2025-02-27 SDOH — ECONOMIC STABILITY: FOOD INSECURITY: WITHIN THE PAST 12 MONTHS, THE FOOD YOU BOUGHT JUST DIDN'T LAST AND YOU DIDN'T HAVE MONEY TO GET MORE.: NEVER TRUE

## 2025-02-27 ASSESSMENT — PATIENT HEALTH QUESTIONNAIRE - PHQ9
SUM OF ALL RESPONSES TO PHQ QUESTIONS 1-9: 0
1. LITTLE INTEREST OR PLEASURE IN DOING THINGS: NOT AT ALL
2. FEELING DOWN, DEPRESSED OR HOPELESS: NOT AT ALL

## 2025-02-27 NOTE — PROGRESS NOTES
S: 24 y.o. female with   Chief Complaint   Patient presents with    Breast Pain     Patient states R breast is having pain.       HPI: please see resident note for HPI and ROS.    BP Readings from Last 3 Encounters:   02/27/25 102/72   09/12/24 116/60   09/04/24 (!) 99/95     Wt Readings from Last 3 Encounters:   02/27/25 66 kg (145 lb 9.6 oz)   09/12/24 72.6 kg (160 lb)   09/04/24 72.6 kg (160 lb)       O: VS:  height is 1.499 m (4' 11.02\") and weight is 66 kg (145 lb 9.6 oz). Her blood pressure is 102/72 and her pulse is 91. Her respiration is 20 and oxygen saturation is 98%.        Diagnosis Orders   1. Benign breast cyst in female, right        2. Routine lab draw  Hemoglobin A1C    TSH reflex to FT4    Lipid Panel    Comprehensive Metabolic Panel, Fasting    CBC with Auto Differential          Plan:  Please refer to resident note for full plan.    24-year-old female here for follow up. Had right breast abscess/cyst s/p excision on 9/4/2024 with Dr. Jimenez. Still having pain in this area. Denies swelling, redness, fever/chills or other signs of infection. Breast exam unremarkable per resident. Supportive care discussed. Follow up with any new or worsening symptoms.     Health Maintenance Due   Topic Date Due    Varicella vaccine (1 of 2 - 13+ 2-dose series) Never done    HPV vaccine (1 - 3-dose series) Never done    Hepatitis B vaccine (1 of 3 - 19+ 3-dose series) Never done    Flu vaccine (1) Never done    COVID-19 Vaccine (1 - 2024-25 season) Never done       Attending Physician Statement  I have discussed the case, including pertinent history and exam findings with the resident.  I agree with the documented assessment and plan as documented by the resident.        Zulma Ryan, DO 3/4/2025 2:22 PM    
signature was used to authenticate this note  - Arline Robertson MD PGY-2 on 2/27/2025 at 3:31 PM

## 2025-02-27 NOTE — PATIENT INSTRUCTIONS
use crema de capsician de venta elliot para el dolor de senos.  También puede usar tylenol según sea necesario para controlar el dolor.  También puede utilizar compresas térmicas para la sensibilidad en los senos.  Si nota algún empeoramiento del dolor, enrojecimiento, hinchazón del seno derecho y fiebre, programe otra samy para ser atendido.

## 2025-02-28 ENCOUNTER — TELEPHONE (OUTPATIENT)
Dept: FAMILY MEDICINE CLINIC | Age: 25
End: 2025-02-28

## 2025-02-28 NOTE — TELEPHONE ENCOUNTER
----- Message from Dr. Arline Robertson MD sent at 2/27/2025  5:48 PM EST -----  Please inform patient labs were normal

## 2025-02-28 NOTE — TELEPHONE ENCOUNTER
----- Message from Dr. Arline Robertson MD sent at 2/27/2025  5:47 PM EST -----  Your recent lab results are normal.

## (undated) DEVICE — SUTURE MONOCRYL SZ 4-0 L27IN ABSRB UD L19MM PS-2 1/2 CIR PRIM Y426H

## (undated) DEVICE — SUTURE VICRYL + SZ 3-0 L27IN ABSRB UD L26MM SH 1/2 CIR VCP416H

## (undated) DEVICE — BREAST HERNIA: Brand: MEDLINE INDUSTRIES, INC.

## (undated) DEVICE — GLOVE SURG 7 PF POLYMER COAT WHT STRL SIGN LTX ESSENTIAL LTX

## (undated) DEVICE — PENCIL SMK EVAC ALL IN 1 DSGN ENH VISIBILITY IMPROVED AIR

## (undated) DEVICE — SPONGE GZ W4XL4IN COT 12 PLY TYP VII WVN C FLD DSGN STERILE